# Patient Record
Sex: MALE | Race: BLACK OR AFRICAN AMERICAN | URBAN - METROPOLITAN AREA
[De-identification: names, ages, dates, MRNs, and addresses within clinical notes are randomized per-mention and may not be internally consistent; named-entity substitution may affect disease eponyms.]

---

## 2020-06-01 ENCOUNTER — COMMUNICATION - HEALTHEAST (OUTPATIENT)
Dept: EMERGENCY MEDICINE | Facility: CLINIC | Age: 50
End: 2020-06-01

## 2020-06-29 ENCOUNTER — TELEPHONE (OUTPATIENT)
Dept: FAMILY MEDICINE | Facility: CLINIC | Age: 50
End: 2020-06-29

## 2020-06-29 ENCOUNTER — VIRTUAL VISIT (OUTPATIENT)
Dept: FAMILY MEDICINE | Facility: CLINIC | Age: 50
End: 2020-06-29
Payer: MEDICAID

## 2020-06-29 VITALS — BODY MASS INDEX: 29.68 KG/M2 | HEIGHT: 71 IN | TEMPERATURE: 98.4 F | WEIGHT: 212 LBS

## 2020-06-29 DIAGNOSIS — J30.1 ALLERGIC RHINITIS DUE TO POLLEN, UNSPECIFIED SEASONALITY: ICD-10-CM

## 2020-06-29 DIAGNOSIS — R06.00 DYSPNEA, UNSPECIFIED TYPE: Primary | ICD-10-CM

## 2020-06-29 DIAGNOSIS — F41.9 ANXIETY: ICD-10-CM

## 2020-06-29 DIAGNOSIS — J45.909 UNCOMPLICATED ASTHMA, UNSPECIFIED ASTHMA SEVERITY, UNSPECIFIED WHETHER PERSISTENT: Primary | ICD-10-CM

## 2020-06-29 DIAGNOSIS — J45.909 UNCOMPLICATED ASTHMA, UNSPECIFIED ASTHMA SEVERITY, UNSPECIFIED WHETHER PERSISTENT: ICD-10-CM

## 2020-06-29 RX ORDER — ALBUTEROL SULFATE 0.83 MG/ML
2.5 SOLUTION RESPIRATORY (INHALATION) EVERY 6 HOURS PRN
Qty: 30 VIAL | Refills: 1 | Status: SHIPPED | OUTPATIENT
Start: 2020-06-29

## 2020-06-29 RX ORDER — ALBUTEROL SULFATE 90 UG/1
1 AEROSOL, METERED RESPIRATORY (INHALATION) EVERY 4 HOURS PRN
Qty: 1 INHALER | Refills: 3 | Status: SHIPPED | OUTPATIENT
Start: 2020-06-29 | End: 2020-08-09

## 2020-06-29 RX ORDER — HYDROXYZINE HYDROCHLORIDE 25 MG/1
25 TABLET, FILM COATED ORAL EVERY 6 HOURS PRN
Qty: 60 TABLET | Refills: 0 | Status: SHIPPED | OUTPATIENT
Start: 2020-06-29 | End: 2020-08-09

## 2020-06-29 RX ORDER — HYDROXYZINE PAMOATE 25 MG/1
25 CAPSULE ORAL 2 TIMES DAILY PRN
Qty: 30 CAPSULE | Refills: 0 | Status: SHIPPED | OUTPATIENT
Start: 2020-06-29

## 2020-06-29 RX ORDER — FLUTICASONE PROPIONATE 110 UG/1
1 AEROSOL, METERED RESPIRATORY (INHALATION) 2 TIMES DAILY
Qty: 12 G | Refills: 3 | Status: SHIPPED | OUTPATIENT
Start: 2020-06-29 | End: 2021-07-15

## 2020-06-29 RX ORDER — CETIRIZINE HYDROCHLORIDE 10 MG/1
10 TABLET ORAL DAILY
Qty: 60 TABLET | Refills: 3 | Status: SHIPPED | OUTPATIENT
Start: 2020-06-29 | End: 2020-08-09

## 2020-06-29 SDOH — HEALTH STABILITY: MENTAL HEALTH: HOW OFTEN DO YOU HAVE A DRINK CONTAINING ALCOHOL?: NEVER

## 2020-06-29 ASSESSMENT — MIFFLIN-ST. JEOR: SCORE: 1853.76

## 2020-06-29 NOTE — TELEPHONE ENCOUNTER
[facility] Answering Service Pager Note    I received an answering service page at 5:30pm regarding prescriptions.    I called Chan and we spoke on the phone. He had a virtual visit with his PCP today and discussed respiratory distress and insomnia. He has prescriptions for albuterol inhaler, fluticasone inhaler, and cetirizine sent to the pharmacy right now. The patient reports that he also discussed an order for albuterol nebulizer solution and a small prescription for hydroxyzine to help with anxiety and sleep. The note from today's visit is not completed yet, but this seems like a reasonable request based on the patient's history, I will send these orders in now. The patient is also interested in getting his own nebulizer machine as he is borrowing now, we discussed that this would be an appropriate thing to bring up at a formal follow up visit.    Chan stated understanding and agreement with this plan.    Shakeel Simpson MD  PGY2  [facility]  Pager: [phone]

## 2020-06-29 NOTE — PROGRESS NOTES
"Family Medicine Video Visit Note    Chan is being evaluated via a billable video visit.           Video Visit Consent     Patient was verbally read the following and verbal consent was obtained.  \"Video visits are billed at different rates depending on your insurance coverage. During this emergency period, for some insurers they may be billed the same as an in-person visit.  Please reach out to your insurance provider with any questions.  If during the course of the call the physician/provider feels a video visit is not appropriate, you will not be charged for this service.\"     (Name person giving consent:  Patient   Date verbal consent given:  6/29/2020  Time verbal consent given:  3:13 PM)    Patient would like the video invitation sent by: Text to cell phone: [phone]    Chief Complaint   Patient presents with     other     spider bite, breathing problem, swelling where the bite was and went to the ER and was treated for it.  But now start having shortness of breath again.            HPI     Video Start Time: 3:30 PM    Chan presents to clinic today with chief complaint of shortness of breath.    The patient reports being bit by a black  spider approximately one month ago. He presented to the [facility] ED after the spider bite and was prescribed Keflex due to concern about a skin infection. Several days later on 5/30/2020, he returned to the ED for chest pain and shortness of breath. He was concerned about an allergic reaction to Keflex.     In the ED on 5/30/2020, EKG revealed normal sinus rhythm with no evidence of ischemia or infarction. Chest x-ray was normal with no evidence of pneumonia, effusion, edema, or pneumothorax. Troponin was within normal limits, as was CRP, CBC and BMP. COVID-19 PCR test was negative. His symptoms were attributed to anxiety, and he received a dose of Ativan with resolution of his presenting symptoms. He was discharged with hydroxyzine and encouraged to " complete the course of Keflex.    The patient had improvement in his symptoms and felt at his baseline up until 5 days ago. Today he reports chest heaviness and a feeling of denseness in his chest. He feels this mainly in the lower pectoral regions bilaterally. He does feel short of breath. He is using his partner's albuterol nebulizer for the shortness of breath, and the nebulizer treatments seem to help.    He had a subjective fever several days ago. He is using a fan to stay cool. He has had occasional cough as well, which has been productive of clear, thin phlegm. He thinks his allergies may be contributing to his shortness of breath. He states there has been no redness, bleeding, purulent discharge or pain at the site of the spider bite on his wrist.    Current Outpatient Medications   Medication Sig Dispense Refill     albuterol (PROAIR HFA/PROVENTIL HFA/VENTOLIN HFA) 108 (90 Base) MCG/ACT inhaler Inhale 1 puff into the lungs every 4 hours as needed for shortness of breath / dyspnea or wheezing 1 Inhaler 3     cetirizine (ZYRTEC) 10 MG tablet Take 1 tablet (10 mg) by mouth daily 60 tablet 3     fluticasone (FLOVENT HFA) 110 MCG/ACT inhaler Inhale 1 puff into the lungs 2 times daily 12 g 3     hydrOXYzine (ATARAX) 25 MG tablet Take 1 tablet (25 mg) by mouth every 6 hours as needed for anxiety 60 tablet 0     albuterol (PROVENTIL) (2.5 MG/3ML) 0.083% neb solution Take 1 vial (2.5 mg) by nebulization every 6 hours as needed for shortness of breath / dyspnea or wheezing 30 vial 1     hydrOXYzine (VISTARIL) 25 MG capsule Take 1 capsule (25 mg) by mouth 2 times daily as needed for anxiety 30 capsule 0     No Known Allergies           Review of Systems:     CONSTITUTIONAL: See above.   HEENT: No headache or neck pain. No recent changes in vision, eye redness, dry eyes, or eye irritation. No runny nose, nasal congestion, sinus pain, or sore throat.  SKIN: See above.   RESPIRATORY: See above.  CARDIOVASCULAR: See  "above. No palpitations or irregular heart beats. No syncope. No lower extremity swelling.  NEUROLOGIC: No tremors, shaking, seizure, dizziness.  PSYCHIATRIC: See above.          Physical Exam:     Temp 98.4  F (36.9  C)   Ht 1.803 m (5' 11\")   Wt 96.2 kg (212 lb)   BMI 29.57 kg/m    Estimated body mass index is 29.57 kg/m  as calculated from the following:    Height as of this encounter: 1.803 m (5' 11\").    Weight as of this encounter: 96.2 kg (212 lb).    GENERAL: Healthy, alert and no distress  EYES: Eyes grossly normal to inspection.  No discharge or erythema, or obvious scleral/conjunctival abnormalities.  RESP: No audible wheeze, cough, or visible cyanosis.  No visible retractions or increased work of breathing.    SKIN: There is a small circular area on the surface of the wrist that appears white with surrounding, darker appearing skin. No erythema, bleeding, discharge or edema noted.  NEURO: Cranial nerves grossly intact.  Mentation and speech appropriate for age.  PSYCH: Mentation appears normal, affect normal/bright, judgement and insight intact, normal speech and appearance well-groomed.        Assessment and Plan     1. Dyspnea, unspecified type  Likely secondary to asthma versus anxiety. Will provide albuterol and fluticasone inhalers today for treatment of asthma. Patient does meet COVID-19 PCR testing, so will test for this as well. Will have patient schedule an in-person clinic visit at which time more complete assessment, including lung auscultation, possible labs, possible EKG, and ossible CXR can be performed.  - albuterol (PROAIR HFA/PROVENTIL HFA/VENTOLIN HFA) 108 (90 Base) MCG/ACT inhaler; Inhale 1 puff into the lungs every 4 hours as needed for shortness of breath / dyspnea or wheezing  Dispense: 1 Inhaler; Refill: 3  - fluticasone (FLOVENT HFA) 110 MCG/ACT inhaler; Inhale 1 puff into the lungs 2 times daily  Dispense: 12 g; Refill: 3  - Symptomatic COVID-19 Virus (Coronavirus) by PCR; " Future    2. Uncomplicated asthma, unspecified asthma severity, unspecified whether persistent  Patient reports history of asthma. Currently have some improvement in asthma symptoms with use of his partner's albuterol. Will prescribe albuterol inhaler PRN as well as controlled medication today.  - albuterol (PROAIR HFA/PROVENTIL HFA/VENTOLIN HFA) 108 (90 Base) MCG/ACT inhaler; Inhale 1 puff into the lungs every 4 hours as needed for shortness of breath / dyspnea or wheezing  Dispense: 1 Inhaler; Refill: 3  - fluticasone (FLOVENT HFA) 110 MCG/ACT inhaler; Inhale 1 puff into the lungs 2 times daily  Dispense: 12 g; Refill: 3    3. Allergic rhinitis due to pollen, unspecified seasonality  Patient notes rhinorrhea, postnasal drip and seasonal allergies. Will prescribe cetirizine today.  - cetirizine (ZYRTEC) 10 MG tablet; Take 1 tablet (10 mg) by mouth daily  Dispense: 60 tablet; Refill: 3    4. Anxiety  Possible explanation for patients shortness of breath and chest heaviness. Symptoms initially improved with Ativan. Has responded will to hydroxyzine as outpatient. Refill for hydroxyzine provided.   - hydrOXYzine (ATARAX) 25 MG tablet; Take 1 tablet (25 mg) by mouth every 6 hours as needed for anxiety  Dispense: 60 tablet; Refill: 0    Video-Visit Details    Type of service:  Video Visit    Video End Time (time video stopped): 3:55    Originating Location (pt. Location): Home    Distant Location (provider location):  [facility]     Mode of Communication:  Video Conference via [employer]      Patient was discussed with attending physician, Dr. Omar Stevens, who agrees with the assessment and plan.    Jose Lerner, PGY-2  [facility]  6/29/2020

## 2020-06-30 ENCOUNTER — OFFICE VISIT (OUTPATIENT)
Dept: FAMILY MEDICINE | Facility: CLINIC | Age: 50
End: 2020-06-30
Payer: MEDICAID

## 2020-06-30 VITALS
SYSTOLIC BLOOD PRESSURE: 143 MMHG | HEART RATE: 78 BPM | DIASTOLIC BLOOD PRESSURE: 95 MMHG | RESPIRATION RATE: 18 BRPM | TEMPERATURE: 98.8 F | OXYGEN SATURATION: 97 %

## 2020-06-30 DIAGNOSIS — R74.8 ELEVATED CPK: ICD-10-CM

## 2020-06-30 DIAGNOSIS — R07.89 CHEST DISCOMFORT: Primary | ICD-10-CM

## 2020-06-30 DIAGNOSIS — J45.909 UNCOMPLICATED ASTHMA, UNSPECIFIED ASTHMA SEVERITY, UNSPECIFIED WHETHER PERSISTENT: ICD-10-CM

## 2020-06-30 DIAGNOSIS — Z20.822 SUSPECTED COVID-19 VIRUS INFECTION: ICD-10-CM

## 2020-06-30 LAB
COVID-19 VIRUS PCR TO U OF MN - SOURCE: NORMAL
HBA1C MFR BLD: 5.6 % (ref 4.1–5.7)
SARS-COV-2 RNA SPEC QL NAA+PROBE: NOT DETECTED

## 2020-06-30 RX ORDER — ALBUTEROL SULFATE 0.83 MG/ML
2.5 SOLUTION RESPIRATORY (INHALATION) EVERY 6 HOURS PRN
Qty: 3 ML | Refills: 4 | Status: SHIPPED | OUTPATIENT
Start: 2020-06-30

## 2020-06-30 NOTE — PROGRESS NOTES
[facility] Note    Patient: Chan Simmons  : 1970  MRN: [id number]         SUBJECTIVE       Chan Simmons is a 49 year old male with no significant past medical history who presents to clinic today with chief complaint of chest heaviness.    The patient reports several day history of chest heaviness.  He describes sensation of having a denseness in his chest.  He feels the sensation in his lower pectoral regions on the left and right side of his chest.  Sometimes he experiences this during rest and other times during exertional activities.  He was mowing the lawn several days ago and experienced the chest heaviness.  He is also noticed it lifting weights at the gym.  Sometimes climbing stairs and walking around will bring on the chest heaviness too.  No palpitations or irregular heart beats. No syncope. No lower extremity swelling.    The patient attributes the chest heaviness to his asthma and his allergies.  He has been prescribed albuterol, Advair, and Zyrtec to help with his allergy and asthma symptoms.  He did present to the emergency department approximately 1 month ago for shortness of breath.  Today he states that he does not feel short of breath although during his telemedicine visit yesterday he had noted recent shortness of breath.  No significant cough or wheezing.  He does have some clear nasal discharge that drains down the back of his throat from time to time.  No fevers or chills.  No muscle aches or joint pains.  No fatigue.    The patient also questions if his current chest heaviness is related to a spider bite 1 month ago.  He reports being bitten by a black spider while shoveling dirt.  He went to the emergency department after the spider bite and was told that it was a black  spider.  He continues to have a maria fernanda on his skin at the left wrist where the spider bit him.  However no surrounding redness of the skin, and no recent bleeding or  discharge from the bite maria fernanda.    Past Medical History, Past Surgical History, Medications, Allergies, and Family History were reviewed and updated as needed.        REVIEW OF SYSTEMS     CONSTITUTIONAL: See above.   HEENT: See above. No headache or neck pain. No recent changes in vision, eye redness, dry eyes, or eye irritation. No sore throat.  SKIN: See above  RESPIRATORY: See above.   CARDIOVASCULAR: See above.   GASTROINTESTINAL: No nausea, vomiting, reflux, diarrhea, or constipation. No abdominal pain, cramping, or bloating.  MUSCULOSKELETAL: See above.         OBJECTIVE     Vitals:    06/30/20 1234   BP: (!) 143/95   Pulse: 78   Resp: 18   Temp: 98.8  F (37.1  C)   SpO2: 97%     There is no height or weight on file to calculate BMI.    Physical Exam:  GENERAL: Awake, alert. Well-nourished, well-developed. No acute distress. Appears comfortable.  HEENT: Head: Normocephalic and atraumatic; no dysmorphic features. Eyes: Eye lids and lashes normal; pupils equal, round and reactive to light; no scleral icterus or conjunctival injection.   NECK: Supple and symmetric. Trachea midline. No anterior or posterior cervical lymphadenopathy, no supraclavicular lymphadenopathy.   SKIN: There is a circular 1 cm diameter healing wound on the extensor surface of the left wrist which appears clean, dry and intact.  No surrounding erythema and no bleeding or discharge from the healing skin wound.  CHEST: Chest appears symmetric; no pectus excavatum or carinatum.  No tenderness to palpation over the sternum or left or right rib cage.  LUNGS: No increased work of breathing; good air movement throughout all lung fields; breath sounds clear to auscultation bilaterally throughout all lung fields with no crackles or wheezing.  CARDIOVASCULAR: Regular rate and rhythm; normal S1 and S2; no S3 or S4; no murmur, rub or click. Radial and dorsalis pedis/posterior tibial pulses intact; normal capillary refill. No peripheral edema.  ABDOMEN:  Non-distended. Soft and non-tender in all quadrants; no masses or hepatosplenomegally.  PSYCH: Normal affect, mood, orientation, memory and insight.    LABORATORY:  Results for orders placed or performed in visit on 06/30/20 (from the past 24 hour(s))   Hemoglobin A1c ([facility])   Result Value Ref Range    Hemoglobin A1C 5.6 4.1 - 5.7 %       ASSESSMENT AND PLAN     1. Chest discomfort  Presents with several day history of chest heaviness.  Sensation is located mainly in the lower pectoral regions bilaterally.  Sometimes it is associated with exertional activities.  Suspect some component of anxiety versus asthma.  Lungs clear to auscultation and no evidence of acute respiratory distress; no concern for asthma exacerbation.  EKG in clinic today revealed normal sinus rhythm with no acute ischemic findings.  Doubt ACS.  We will order a chest x-ray to evaluate for evidence of pneumonia, pneumothorax, effusion, and edema although all of these etiologies seem less likely.  We will also test the patient for COVID-19 given his recent report of dyspnea and subjective fever.  We will check lipid panel and hemoglobin A1c to assess cardiac risk factors.  - EKG 12-lead complete w/read - Clinics  - XR CHEST 2 VW  - Lipid Panel ([place])  - Hemoglobin A1c ([facility])    2. Suspected COVID-19 virus infection  Patient endorsing chest heaviness and recent history of shortness of breath and subjective fever.  Tested negative for COVID-19 on PCR test approximately 1 month ago.  Given his recent symptoms he qualifies for COVID-19 PCR testing which was collected today via nasopharyngeal swab.  - [facility]    3. Elevated CPK  Patient was noted to have an elevated total CK on 5/26/2020 in the [facility] emergency department.  This was in the setting of recent spider bite.  We will repeat total CK today as well as check thyroid function.  - Thyroid [place] ([facility])  - CK Total ([facility])    4.  Uncomplicated asthma, unspecified asthma severity, unspecified whether persistent  Patient with history of asthma.  Was prescribed fluticasone inhaler and albuterol inhaler yesterday.  He is requesting to have a nebulizer machine available at home for himself so that he does not have to borrow and acquaintances.  Order for albuterol nebulizer solution and the nebulizer machine were provided to the patient today.  - albuterol (PROVENTIL) (2.5 MG/3ML) 0.083% neb solution; Take 1 vial (2.5 mg) by nebulization every 6 hours as needed for shortness of breath / dyspnea or wheezing  Dispense: 3 mL; Refill: 4  - order for DME; Adult nebulizer mask and tubing.  Dispense: 1 each; Refill: 0      Return to clinic in 4 weeks for follow-up of chest heaviness or sooner if develops new or worsening symptoms.    Patient was discussed with attending physician, Dr. Robert Gongora MD, who agrees with the assessment and plan.    Jose Lerner, PGY-2  [facility]  6/30/2020

## 2020-06-30 NOTE — PROGRESS NOTES
Preceptor Attestation:   Patient seen and evaluated via video visit. I discussed the patient with the resident. I have verified the content of the note, which accurately reflects my assessment of the patient and the plan of care.   Supervising Physician:  Omar Stevens MD.

## 2020-06-30 NOTE — PROGRESS NOTES
Preceptor Attestation:    Patient seen and evaluated in person. I discussed the patient with the resident. I personally reviewed the imaging and agree with the interpretation documented by the resident. I personally viewed the EKG and agree with the interpretation documented by the resident. I have verified the content of the note, which accurately reflects my assessment of the patient and the plan of care.   Supervising Physician:  Robert Gongora MD.

## 2020-07-01 NOTE — RESULT ENCOUNTER NOTE
I called the patient and discussed these results via telephone call. All questions were answered.    Jose Lerner MD  July 1, 2020

## 2020-07-02 DIAGNOSIS — R07.89 CHEST DISCOMFORT: ICD-10-CM

## 2020-07-02 DIAGNOSIS — R74.8 ELEVATED CPK: ICD-10-CM

## 2020-07-02 LAB
CHOLEST SERPL-MCNC: 152.3 MG/DL (ref 0–200)
CHOLEST/HDLC SERPL: 4.1 {RATIO} (ref 0–5)
CK SERPL-CCNC: 659 U/L (ref 30–190)
HDLC SERPL-MCNC: 37.3 MG/DL
LDLC SERPL CALC-MCNC: 94 MG/DL (ref 0–129)
TRIGL SERPL-MCNC: 105.2 MG/DL (ref 0–150)
TSH SERPL DL<=0.05 MIU/L-ACNC: 0.65 UIU/ML (ref 0.3–5)
VLDL CHOLESTEROL: 21 MG/DL (ref 7–32)

## 2020-07-02 NOTE — LETTER
July 6, 2020      Chan Simmons  [address] [address]  [place] [address]        Dear ,    We are writing to inform you of your test results.    Here are the results of the recent laboratory tests taken at [facility].     Your thyroid function was normal. Your cholesterol levels were normal as well.   Your CK total level was elevated. This is something that we will plan to recheck in the future to make sure the level comes down into the normal range.     Please call [facility] with any questions or concerns.     Resulted Orders   Thyroid [place] ([facility])   Result Value Ref Range    TSH 0.65 0.30 - 5.00 uIU/mL    Narrative    Test performed by:  [facility]  [address] [place] [address]   Lipid Panel ([place])   Result Value Ref Range    Cholesterol 152.3 0.0 - 200.0 mg/dL    Cholesterol/HDL Ratio 4.1 0.0 - 5.0    HDL Cholesterol 37.3 (L) >40.0 mg/dL    LDL Cholesterol Calculated 94 0 - 129 mg/dL    Triglycerides 105.2 0.0 - 150.0 mg/dL    VLDL Cholesterol 21.0 7.0 - 32.0 mg/dL   CK Total ([facility])   Result Value Ref Range    CK, Total 659 (HH) 30 - 190 U/L    Narrative    Test performed by:  [facility]  [address] [place] [address]       If you have any questions or concerns, please call the clinic at the number listed above.       Sincerely,        [facility]

## 2020-07-06 NOTE — RESULT ENCOUNTER NOTE
Kj Coyle,    When convenient, could you please mail these results to this patient with the following message. Thank you.    Dear Izaiah,    Here are the results of the recent laboratory tests taken at [facility].     Your thyroid function was normal. Your cholesterol levels were normal as well.  Your CK total level was elevated. This is something that we will plan to recheck in the future to make sure the level comes down into the normal range.     Please call [facility] with any questions or concerns.    Sincerely,  Jose Lerner MD

## 2020-08-06 ENCOUNTER — TELEPHONE (OUTPATIENT)
Dept: FAMILY MEDICINE | Facility: CLINIC | Age: 50
End: 2020-08-06

## 2020-08-06 NOTE — TELEPHONE ENCOUNTER
"Patient called to make sure he had refills left on his medications, as he is trying to \"stay ahead of the game.\" Read through his medication list and let him know how many refills were provided with each medication.     He is hoping for a refill of Hydroxyzine, as this is helping him a lot with anxiety. He takes it a few times a week (not daily).    He is also wondering if he can take Alavert (Loratidine) with his Zyrtec, as he took one of his son's pills and found it helped a lot.     Patient is doing well and feels his asthma is well controlled. He is wondering if Dr. Lerner will send in new refills when needed or if needs an appointment. Routed to Dr. Lerner. ./LR  "

## 2020-08-06 NOTE — TELEPHONE ENCOUNTER
[facility] phone call message- general phone call:    Reason for call: He would like to talk to his Doctor  re his A;rickey.    Action desired: call back.    Return call needed: Yes    OK to leave a message on voice mail? Yes    Advised patient to response may take up to 2 business days: Yes    Primary language: English      needed? No    Call taken on August 6, 2020 at 2:15 PM by Freddie Navas

## 2020-08-09 DIAGNOSIS — J45.909 UNCOMPLICATED ASTHMA, UNSPECIFIED ASTHMA SEVERITY, UNSPECIFIED WHETHER PERSISTENT: ICD-10-CM

## 2020-08-09 DIAGNOSIS — F41.9 ANXIETY: ICD-10-CM

## 2020-08-09 DIAGNOSIS — R06.00 DYSPNEA, UNSPECIFIED TYPE: ICD-10-CM

## 2020-08-09 RX ORDER — ALBUTEROL SULFATE 90 UG/1
1 AEROSOL, METERED RESPIRATORY (INHALATION) EVERY 4 HOURS PRN
Qty: 1 INHALER | Refills: 3 | Status: SHIPPED | OUTPATIENT
Start: 2020-08-09

## 2020-08-09 RX ORDER — LORATADINE 10 MG/1
10 TABLET ORAL DAILY
Qty: 60 TABLET | Refills: 3 | Status: SHIPPED | OUTPATIENT
Start: 2020-08-09

## 2020-08-09 RX ORDER — HYDROXYZINE HYDROCHLORIDE 25 MG/1
25 TABLET, FILM COATED ORAL EVERY 6 HOURS PRN
Qty: 60 TABLET | Refills: 3 | Status: SHIPPED | OUTPATIENT
Start: 2020-08-09

## 2020-08-10 NOTE — TELEPHONE ENCOUNTER
Relayed medication refills to patient. Discussed that he should not take Zyrtec and Claritin together, but to try Claritin for 1-2 weeks and see if it was more helpful.    No further questions or concerns. ./LR

## 2020-08-10 NOTE — PROGRESS NOTES
Refill for albuterol and hydroxyzine sent to the patient's preferred pharmacy. Prescription for loratadine sent to pharmacy as well as he would like to switch to loratadine from cetirizine.     Jose Lerner MD  August 9, 2020

## 2020-10-22 ENCOUNTER — OFFICE VISIT - HEALTHEAST (OUTPATIENT)
Dept: FAMILY MEDICINE | Facility: CLINIC | Age: 50
End: 2020-10-22

## 2020-10-22 DIAGNOSIS — Z13.220 ENCOUNTER FOR SCREENING FOR LIPOID DISORDERS: ICD-10-CM

## 2020-10-22 DIAGNOSIS — J45.40 MODERATE PERSISTENT ASTHMA WITHOUT COMPLICATION: ICD-10-CM

## 2020-10-22 DIAGNOSIS — Z12.11 ENCOUNTER FOR COLORECTAL CANCER SCREENING: ICD-10-CM

## 2020-10-22 DIAGNOSIS — Z12.12 ENCOUNTER FOR COLORECTAL CANCER SCREENING: ICD-10-CM

## 2020-10-22 DIAGNOSIS — F41.9 ANXIETY: ICD-10-CM

## 2020-10-22 DIAGNOSIS — Z12.5 ENCOUNTER FOR PROSTATE CANCER SCREENING: ICD-10-CM

## 2020-10-22 DIAGNOSIS — Z11.3 SCREEN FOR STD (SEXUALLY TRANSMITTED DISEASE): ICD-10-CM

## 2020-10-22 DIAGNOSIS — Z00.00 ENCOUNTER FOR GENERAL ADULT MEDICAL EXAMINATION WITHOUT ABNORMAL FINDINGS: ICD-10-CM

## 2020-10-22 LAB
ALBUMIN SERPL-MCNC: 4 G/DL (ref 3.5–5)
ALP SERPL-CCNC: 88 U/L (ref 45–120)
ALT SERPL W P-5'-P-CCNC: 62 U/L (ref 0–45)
ANION GAP SERPL CALCULATED.3IONS-SCNC: 9 MMOL/L (ref 5–18)
AST SERPL W P-5'-P-CCNC: 34 U/L (ref 0–40)
BILIRUB SERPL-MCNC: 0.4 MG/DL (ref 0–1)
BUN SERPL-MCNC: 15 MG/DL (ref 8–22)
CALCIUM SERPL-MCNC: 9.8 MG/DL (ref 8.5–10.5)
CHLORIDE BLD-SCNC: 102 MMOL/L (ref 98–107)
CHOLEST SERPL-MCNC: 198 MG/DL
CO2 SERPL-SCNC: 27 MMOL/L (ref 22–31)
CREAT SERPL-MCNC: 0.74 MG/DL (ref 0.7–1.3)
FASTING STATUS PATIENT QL REPORTED: YES
GFR SERPL CREATININE-BSD FRML MDRD: >60 ML/MIN/1.73M2
GLUCOSE BLD-MCNC: 74 MG/DL (ref 70–125)
HDLC SERPL-MCNC: 36 MG/DL
HGB BLD-MCNC: 14.6 G/DL (ref 14–18)
HIV 1+2 AB+HIV1 P24 AG SERPL QL IA: NEGATIVE
LDLC SERPL CALC-MCNC: 127 MG/DL
POTASSIUM BLD-SCNC: 4.1 MMOL/L (ref 3.5–5)
PROT SERPL-MCNC: 7.9 G/DL (ref 6–8)
PSA SERPL-MCNC: 0.4 NG/ML (ref 0–2.5)
SODIUM SERPL-SCNC: 138 MMOL/L (ref 136–145)
TRIGL SERPL-MCNC: 174 MG/DL
TSH SERPL DL<=0.005 MIU/L-ACNC: 0.89 UIU/ML (ref 0.3–5)

## 2020-10-22 ASSESSMENT — MIFFLIN-ST. JEOR: SCORE: 1869.16

## 2020-10-23 LAB
25(OH)D3 SERPL-MCNC: 25.3 NG/ML (ref 30–80)
25(OH)D3 SERPL-MCNC: 25.3 NG/ML (ref 30–80)
C TRACH DNA SPEC QL PROBE+SIG AMP: NEGATIVE
HBV SURFACE AG SERPL QL IA: NEGATIVE
HCV AB SERPL QL IA: NEGATIVE
N GONORRHOEA DNA SPEC QL NAA+PROBE: NEGATIVE
T PALLIDUM AB SER QL: NEGATIVE

## 2020-10-27 ENCOUNTER — COMMUNICATION - HEALTHEAST (OUTPATIENT)
Dept: FAMILY MEDICINE | Facility: CLINIC | Age: 50
End: 2020-10-27

## 2021-01-04 ENCOUNTER — HEALTH MAINTENANCE LETTER (OUTPATIENT)
Age: 51
End: 2021-01-04

## 2021-04-27 ENCOUNTER — COMMUNICATION - HEALTHEAST (OUTPATIENT)
Dept: SCHEDULING | Facility: CLINIC | Age: 51
End: 2021-04-27

## 2021-06-05 VITALS
BODY MASS INDEX: 31.5 KG/M2 | HEIGHT: 70 IN | HEART RATE: 77 BPM | WEIGHT: 220 LBS | SYSTOLIC BLOOD PRESSURE: 124 MMHG | DIASTOLIC BLOOD PRESSURE: 70 MMHG | OXYGEN SATURATION: 98 %

## 2021-06-12 NOTE — TELEPHONE ENCOUNTER
Patient Returning Call  Reason for call:  Call back  Information relayed to patient:  Writer relayed message to Pt: Normal lab results and negative for STDs. Your vitamin D was insufficient so I will recommend that you take vitamin D supplements, 2,000 units every day. This can be found over the counter.    Pt agrees, and understands. Pt is having issues viewing this information on [employer]. Please release.    Patient has additional questions:  No  If YES, what are your questions/concerns:  N/A  Okay to leave a detailed message?: No call back needed

## 2021-06-17 NOTE — TELEPHONE ENCOUNTER
"Triage call:    Caller: Patient    Had his 1st COVID Pfizer shot in [place], [place], 3 hours away from where he lives.   He was told by \"the registered nurse I just talked to\" that \"a nurse can order another dose for me\".  I reviewed with the patient that we have very strict criteria of when we can order a 2nd COVID-19 test and his situation is not one of them.  He had no further questions and will be going to get his 2nd dose tomorrow.       Pt was advised of protocol recommendation/disposition of homecare    Dalila Schroeder RN 04/27/21 5:33 PM   [facility] Nurse Advisor      "

## 2021-06-18 NOTE — PATIENT INSTRUCTIONS - HE
Patient Instructions by Roberta Rinaldi FNP at 10/22/2020 10:00 AM     Author: Roberta Rinaldi FNP Service: -- Author Type: Nurse Practitioner    Filed: 10/22/2020 10:14 AM Encounter Date: 10/22/2020 Status: Signed    : Roberta Rinaldi FNP (Nurse Practitioner)           Preventing Skin Cancer     Use sunscreen of SPF 30 or greater. Apply liberally.   Relaxing in the sun may feel good. But it isnt good for your skin. In fact, the suns harmful rays are the major cause of skin cancer. This is a serious disease that can be life-threatening. People of all ages, races, and backgrounds are at risk.  Skin cancer is the most common cancer in the [place] But in most cases, it can be prevented.  Your role in prevention  You can act today to help prevent skin cancer. Start by avoiding the suns UV (ultraviolet) rays. And dont use tanning beds or lamps. They are no safer than the sun. Taking these steps can help keep you from getting skin cancer. It can also help prevent wrinkles and other aging effects caused by the sun. Make sure your children also follow these safeguards. Now is the time to start taking steps to prevent skin cancer.  When you are outdoors  Protect your skin when you go out during the day. Take safety steps whenever you go out to eat, run errands by car or on foot, or do any outdoor activity. There isnt just one easy way to protect your skin. Its best to follow all of these steps:    Wear tightly woven clothing that covers your skin. Put on a wide-brimmed hat to protect your face, ears, and scalp.    Watch the clock. Try to stay out of the sun between 10 a.m. and 4 p.m. That's when the sun's rays are strongest.    Head for the shade or create your own. Use an umbrella when sitting or strolling.    Know that the suns rays can reflect off sand, water, and snow. This can harm your skin. Take extra care when you are near reflective surfaces.    Keep in mind that even when the weather is hazy or  "cloudy, your skin can be exposed to strong UV rays.    Shield your skin with sunscreen. Also use sunscreen on your childrens skin. Keep babies younger than 6 months old out of the sun.  Tips for using sunscreen  To help prevent skin cancer, choose the right sunscreen and use it correctly. Try these tips:    Choose a sunscreen that has an SPF (sun protection factor) of at least 30. Also choose a sunscreen labeled \"broad spectrum. This will protect you from both UVA and UVB (ultraviolet A and B) rays.    If one brand irritates your skin, try another, such as one without fragrance.    Use a water-resistant sunscreen if you swim or sweat.    Use at least 1 ounce of sunscreen to cover exposed areas. This is enough to fill a shot glass. You might need to adjust the amount depending on your body size.    Put the sunscreen on dry skin about 15 minutes before going outdoors. This gives it time to soak in.    Reapply sunscreen every 2 hours. If youre active, do this more often.    Cover any sun-exposed skin, from your face to your feet. Dont forget your scalp, ears, and lips.    Know that while sunscreen helps protect you, it isnt enough. Sunscreens extend the length of time you can be outdoors before your skin starts to get red. But they don't give you total protection. Using sunscreen doesn't mean you can stay out in the sun for an unlimited time. Your skin cells are still being damaged. You should also wear protective clothing. And try to stay out of the sun as much as you can, especially from 10 a.m. to 4 p.m.  Date Last Reviewed: 7/1/2019 2000-2019 [employer]. [address] [place] [address], [place] [address]. All rights reserved. This information is not intended as a substitute for professional medical care. Always follow your healthcare professional's instructions.        Patient Education   Understanding USDA MyPlate  The USDA (US Department of Agriculture) has guidelines to help you make healthy food " choices. These are called MyPlate. MyPlate shows the food groups that make up healthy meals using the image of a place setting. Before you eat, think about the healthiest choices for what to put onto your plate or into your cup or bowl. To learn more about building a healthy plate, visit www.choosemyplate.gov.       The Food Groups    Fruits: Any fruit or 100% fruit juice counts as part of the Fruit Group. Fruits may be fresh, canned, frozen, or dried, and may be whole, cut-up, or pureed. Make half your plate fruits and vegetables.    Vegetables: Any vegetable or 100% vegetable juice counts as a member of the Vegetable Group. Vegetables may be fresh, frozen, canned, or dried. They can be served raw or cooked and may be whole, cut-up, or mashed. Make half your plate fruits and vegetables.     Grains: All foods made from grains are part of the Grains Group. These include wheat, rice, oats, cornmeal, and barley such as bread, pasta, oatmeal, cereal, tortillas, and grits. Grains should be no more than a quarter of your plate. At least half of your grains should be whole grains.    Protein: This group includes meat, poultry, seafood, beans and peas, eggs, processed soy products (like tofu), nuts (including nut butters), and seeds. Make protein choices no more than a quarter of your plate. Meat and poultry choices should be lean or low fat.    Dairy: All fluid milk products and foods made from milk that contain calcium, like yogurt and cheese are part of the Dairy Group. (Foods that have little calcium, such as cream, butter, and cream cheese, are not part of the group.) Most dairy choices should be low-fat or fat-free.    Oils: These are fats that are liquid at room temperature. They include canola, corn, olive, soybean, and sunflower oil. Foods that are mainly oil include mayonnaise, certain salad dressings, and soft margarines. You should have only 5 to 7 teaspoons of oils a day. You probably already get this much from  the food you eat.  Use [employer] to Help Build Your Meals  The [employer] can help you plan and track your meals and activity. You can look up individual foods to see or compare their nutritional value. You can get guidelines for what and how much you should eat. You can compare your food choices. And you can assess personal physical activities and see ways you can improve. Go to www.[employer].gov/[employer]/.    2106-9925 [employer]. [address] [place] [address], [place] [address]. All rights reserved. This information is not intended as a substitute for professional medical care. Always follow your healthcare professional's instructions.

## 2021-06-20 NOTE — LETTER
Letter by Nissen, Lynette, RN at      Author: Nissen, Lynette, RN Service: -- Author Type: --    Filed:  Encounter Date: 6/1/2020 Status: (Other)       6/1/2020        Chan Simmons  [address] [facility] [address]    This letter provides a written record that you were tested for COVID-19 on 5/30/20.     Your result was negative.    This means that we didnt find the virus that causes COVID-19 in your sample. A test may show negative when you do actually have the virus. This can happen when the virus is in the early stages of infection, before you feel illness symptoms.    Even if you dont have symptoms, they may still appear. For safety, its very important to follow these rules.    Keep yourself away from others (self-isolation):      Stay home. Dont go to work, school or anywhere else.     Stay in your own room (and use your own bathroom), if you can.    Stay away from others in your home. No hugging, kissing or shaking hands. No visitors.    Clean high touch surfaces often (doorknobs, counters, handles, etc.). Use a household cleaning spray or wipes.    Cover your mouth and nose with a mask, tissue or washcloth to avoid spreading germs.    Wash your hands and face often with soap and water.    Stay in self-isolation until you meet ALL of the guidelines below:    1. You have had no fever for at least 72 hours (that is 3 full days of no fever without the use of medicine that reduces fevers), AND  2. other symptoms (such as cough, shortness of breath) have gotten better, AND  3. at least 10 days have passed since your symptoms first appeared.    Going back to work  Check with your employer for any guidelines to follow for going back to work.    Employers: This document serves as formal notice that your employee tested negative for COVID-19, as of the testing date shown above.    For questions regarding this letter or your Negative COVID-19 result, call [phone] between 8A to 6:30P (M-F) and 10A to 6:30P  (weekends).

## 2021-06-29 NOTE — PROGRESS NOTES
Progress Notes by Roberta Rinaldi FNP at 10/22/2020 10:00 AM     Author: Roberta Rinaldi FNP Service: -- Author Type: Nurse Practitioner    Filed: 10/22/2020 10:56 AM Encounter Date: 10/22/2020 Status: Signed    : oRberta Rinaldi FNP (Nurse Practitioner)       MALE PREVENTATIVE EXAM    Assessment and Plan:   Patient has been advised of split billing requirements and indicates understanding: Yes    1. Encounter for general adult medical examination without abnormal findings  Healthy male exam  - Comprehensive Metabolic Panel  - Hemoglobin  - Thyroid Stimulating Hormone (TSH)  - Vitamin D, Total (25-Hydroxy)    2. Encounter for colorectal cancer screening  Discussed need for colonoscopy  - Ambulatory referral for Colonoscopy    3. Encounter for prostate cancer screening  Discussed need for prostate CA screning  - PSA (Prostatic-Specific Antigen), Annual Screen    4. Encounter for screening for lipoid disorders  - Lipid Cascade- FASTING    5. Lupus (H)  Stabl    6. Moderate persistent asthma without complication  Well managed with current treatment, inhaled corticosteroid and inhaled albuterol nebulizer.     7. Anxiety  Stable with current treatment of Hydroxyzine     8. Screen for STD (sexually transmitted disease)  Answered questions regarding screening.   - Chlamydia trachomatis & Neisseria gonorrhoeae, Amplified Detection  - HIV Antigen/Antibody Screening [place]  - Syphilis Screen, Cascade  - Hepatitis B Surface Antigen (HBsAG)  - Hepatitis C Antibody (Anti-HCV)     Next follow up:  No follow-ups on file.    Immunization Review  Adult Imm Review: Due today, orders placed    I discussed the following with the patient:   Adult Healthy Living: Importance of regular exercise  Healthy nutrition  Getting adequate sleep  Stress management  Use of seat belts  Distracted driving  Helmets  Sporting equipment safety  Firearm safety  STI prevention  Contraception options  Supplement use  Herbal  "medications/alternative medical therapies    I have had an Advance Directives discussion with the patient.    Subjective:   Chief Complaint: Chan Simmons is an 49 y.o. male here for a preventative health visit.    Patient has been advised of split billing requirements and indicates understanding: Yes  HPI:  Patient has no concerns today.  He reports that he has not seen a doctor for several years.  He reports having history of lupus that affects his skin.  He reported recently having a spider bite that led to inflammation and respiratory attack.  He was prescribed inhaled corticosteroid and albuterol to help manage his asthma.  Patient reported that he is doing well since then.  Patient denied chest pain, shortness of breath, fever and chills.    Healthy Habits  Are you taking a daily aspirin? No  Do you typically exercising at least 40 min, 3-4 times per week?  Yes  Do you usually eat at least 4 servings of fruit and vegetables a day, include whole grains and fiber and avoid regularly eating high fat foods? NO  Have you had an eye exam in the past two years? Yes  Do you see a dentist twice per year? NO  Do you have any concerns regarding sleep? No    Safety Screen  If you own firearms, are they secured in a locked gun cabinet or with trigger locks? The patient does not own any firearms  Do you feel you are safe where you are living?: Yes (10/22/2020  9:54 AM)  Do you feel you are safe in your relationship(s)?: Yes (10/22/2020  9:54 AM)      Review of Systems:  Please see above.  The rest of the review of systems are negative for all systems.     Cancer Screening     Patient has no health maintenance due at this time          Patient Care Team:  Provider, No Primary Care as PCP - General        History     Not marked as reviewed during this visit.            Objective:   Vital Signs: There were no vitals taken for this visit.       PHYSICAL EXAM  /70   Pulse 77   Ht 5' 10\" (1.778 m)   Wt 220 lb (99.8 " kg)   SpO2 98%   BMI 31.57 kg/m    General appearance: alert, appears stated age and cooperative  Head: Normocephalic, without obvious abnormality, atraumatic  Eyes: conjunctivae/corneas clear. PERRL, EOM's intact. Fundi benign.  Ears: normal TM's and external ear canals both ears  Throat: lips, mucosa, and tongue normal; teeth and gums normal  Neck: no adenopathy, no carotid bruit, no JVD, supple, symmetrical, trachea midline and thyroid not enlarged, symmetric, no tenderness/mass/nodules  Back: symmetric, no curvature. ROM normal. No CVA tenderness.  Lungs: clear to auscultation bilaterally  Chest wall: no tenderness  Heart: regular rate and rhythm, S1, S2 normal, no murmur, click, rub or gallop  Abdomen: soft, non-tender; bowel sounds normal; no masses,  no organomegaly  Male genitalia: normal, deferred  Extremities: extremities normal, atraumatic, no cyanosis or edema  Pulses: 2+ and symmetric  Skin: Skin color, texture, turgor normal. No rashes or lesions  Lymph nodes: Cervical, supraclavicular, and axillary nodes normal.  Neurologic: Grossly normal      The ASCVD Risk score (Margi DC Jr., et al., 2013) failed to calculate for the following reasons:    Cannot find a previous HDL lab    Cannot find a previous total cholesterol lab         Medication List          Accurate as of October 22, 2020 10:56 AM. If you have any questions, ask your nurse or doctor.            CONTINUE taking these medications    fluticasone propionate 110 mcg/actuation inhaler  Also known as: FLOVENT HFA  INSTRUCTIONS: Inhale 1 puff.        hydrOXYzine pamoate 50 MG capsule  Also known as: VISTARIL  INSTRUCTIONS: Take 1 capsule (50 mg total) by mouth 3 (three) times a day as needed for anxiety.        ibuprofen 600 MG tablet  Also known as: ADVIL,MOTRIN  INSTRUCTIONS: Take 1 tablet (600 mg total) by mouth every 6 (six) hours as needed for pain.        loratadine 10 mg tablet  Also known as: CLARITIN  INSTRUCTIONS: Take 10 mg by mouth  daily.               Additional Screenings Completed Today:

## 2021-07-14 DIAGNOSIS — R06.00 DYSPNEA, UNSPECIFIED TYPE: ICD-10-CM

## 2021-07-14 DIAGNOSIS — J45.909 UNCOMPLICATED ASTHMA, UNSPECIFIED ASTHMA SEVERITY, UNSPECIFIED WHETHER PERSISTENT: ICD-10-CM

## 2021-07-15 RX ORDER — DEXAMETHASONE 4 MG/1
TABLET ORAL
Qty: 12 G | Refills: 3 | Status: SHIPPED | OUTPATIENT
Start: 2021-07-15 | End: 2022-02-22

## 2021-10-10 ENCOUNTER — HEALTH MAINTENANCE LETTER (OUTPATIENT)
Age: 51
End: 2021-10-10

## 2021-12-05 ENCOUNTER — HEALTH MAINTENANCE LETTER (OUTPATIENT)
Age: 51
End: 2021-12-05

## 2022-09-24 ENCOUNTER — HEALTH MAINTENANCE LETTER (OUTPATIENT)
Age: 52
End: 2022-09-24

## 2023-01-29 ENCOUNTER — HEALTH MAINTENANCE LETTER (OUTPATIENT)
Age: 53
End: 2023-01-29

## 2024-02-25 ENCOUNTER — HEALTH MAINTENANCE LETTER (OUTPATIENT)
Age: 54
End: 2024-02-25